# Patient Record
Sex: MALE | Race: WHITE
[De-identification: names, ages, dates, MRNs, and addresses within clinical notes are randomized per-mention and may not be internally consistent; named-entity substitution may affect disease eponyms.]

---

## 2018-09-28 ENCOUNTER — HOSPITAL ENCOUNTER (OUTPATIENT)
Dept: HOSPITAL 47 - LABWHC1 | Age: 68
End: 2018-09-28
Attending: INTERNAL MEDICINE
Payer: MEDICARE

## 2018-09-28 DIAGNOSIS — E78.00: ICD-10-CM

## 2018-09-28 DIAGNOSIS — R79.9: ICD-10-CM

## 2018-09-28 DIAGNOSIS — I10: Primary | ICD-10-CM

## 2018-09-28 LAB
ALBUMIN SERPL-MCNC: 4.2 G/DL (ref 3.5–5)
ALP SERPL-CCNC: 87 U/L (ref 38–126)
ALT SERPL-CCNC: 45 U/L (ref 21–72)
ANION GAP SERPL CALC-SCNC: 8 MMOL/L
AST SERPL-CCNC: 38 U/L (ref 17–59)
BASOPHILS # BLD AUTO: 0 K/UL (ref 0–0.2)
BASOPHILS NFR BLD AUTO: 0 %
BUN SERPL-SCNC: 29 MG/DL (ref 9–20)
CALCIUM SPEC-MCNC: 9.4 MG/DL (ref 8.4–10.2)
CHLORIDE SERPL-SCNC: 105 MMOL/L (ref 98–107)
CHOLEST SERPL-MCNC: 205 MG/DL (ref ?–200)
CO2 SERPL-SCNC: 28 MMOL/L (ref 22–30)
EOSINOPHIL # BLD AUTO: 0.3 K/UL (ref 0–0.7)
EOSINOPHIL NFR BLD AUTO: 4 %
ERYTHROCYTE [DISTWIDTH] IN BLOOD BY AUTOMATED COUNT: 5.79 M/UL (ref 4.3–5.9)
ERYTHROCYTE [DISTWIDTH] IN BLOOD: 13 % (ref 11.5–15.5)
GLUCOSE SERPL-MCNC: 92 MG/DL (ref 74–99)
HBA1C MFR BLD: 5.3 % (ref 4–6)
HCT VFR BLD AUTO: 50.6 % (ref 39–53)
HDLC SERPL-MCNC: 35 MG/DL (ref 40–60)
HGB BLD-MCNC: 16.9 GM/DL (ref 13–17.5)
LDLC SERPL CALC-MCNC: 124 MG/DL (ref 0–99)
LYMPHOCYTES # SPEC AUTO: 1.2 K/UL (ref 1–4.8)
LYMPHOCYTES NFR SPEC AUTO: 18 %
MCH RBC QN AUTO: 29.1 PG (ref 25–35)
MCHC RBC AUTO-ENTMCNC: 33.4 G/DL (ref 31–37)
MCV RBC AUTO: 87.4 FL (ref 80–100)
MONOCYTES # BLD AUTO: 0.4 K/UL (ref 0–1)
MONOCYTES NFR BLD AUTO: 7 %
NEUTROPHILS # BLD AUTO: 4.4 K/UL (ref 1.3–7.7)
NEUTROPHILS NFR BLD AUTO: 68 %
PLATELET # BLD AUTO: 245 K/UL (ref 150–450)
POTASSIUM SERPL-SCNC: 4.4 MMOL/L (ref 3.5–5.1)
PROT SERPL-MCNC: 7.1 G/DL (ref 6.3–8.2)
SODIUM SERPL-SCNC: 141 MMOL/L (ref 137–145)
T4 FREE SERPL-MCNC: 1.06 NG/DL (ref 0.78–2.19)
TRIGL SERPL-MCNC: 229 MG/DL (ref ?–150)
WBC # BLD AUTO: 6.4 K/UL (ref 3.8–10.6)

## 2018-09-28 PROCEDURE — 85025 COMPLETE CBC W/AUTO DIFF WBC: CPT

## 2018-09-28 PROCEDURE — 36415 COLL VENOUS BLD VENIPUNCTURE: CPT

## 2018-09-28 PROCEDURE — 83036 HEMOGLOBIN GLYCOSYLATED A1C: CPT

## 2018-09-28 PROCEDURE — 84443 ASSAY THYROID STIM HORMONE: CPT

## 2018-09-28 PROCEDURE — 80061 LIPID PANEL: CPT

## 2018-09-28 PROCEDURE — 80053 COMPREHEN METABOLIC PANEL: CPT

## 2018-09-28 PROCEDURE — 84439 ASSAY OF FREE THYROXINE: CPT

## 2019-09-11 ENCOUNTER — HOSPITAL ENCOUNTER (OUTPATIENT)
Dept: HOSPITAL 47 - LABWHC1 | Age: 69
Discharge: HOME | End: 2019-09-11
Attending: INTERNAL MEDICINE
Payer: MEDICARE

## 2019-09-11 DIAGNOSIS — E78.2: ICD-10-CM

## 2019-09-11 DIAGNOSIS — R79.9: Primary | ICD-10-CM

## 2019-09-11 DIAGNOSIS — I10: ICD-10-CM

## 2019-09-11 DIAGNOSIS — N40.0: ICD-10-CM

## 2019-09-11 LAB
ALBUMIN SERPL-MCNC: 4.5 G/DL (ref 3.8–4.9)
ALBUMIN/GLOB SERPL: 2.05 G/DL (ref 1.6–3.17)
ALP SERPL-CCNC: 97 U/L (ref 41–126)
ALT SERPL-CCNC: 39 U/L (ref 10–49)
ANION GAP SERPL CALC-SCNC: 3.5 MMOL/L (ref 4–12)
AST SERPL-CCNC: 30 U/L (ref 14–35)
BASOPHILS # BLD AUTO: 0.1 K/UL (ref 0–0.2)
BASOPHILS NFR BLD AUTO: 2 %
BUN SERPL-SCNC: 23 MG/DL (ref 9–27)
BUN/CREAT SERPL: 20.91 RATIO (ref 12–20)
CALCIUM SPEC-MCNC: 9.7 MG/DL (ref 8.7–10.3)
CHLORIDE SERPL-SCNC: 105 MMOL/L (ref 96–109)
CO2 SERPL-SCNC: 31.5 MMOL/L (ref 21.6–31.8)
EOSINOPHIL # BLD AUTO: 0.3 K/UL (ref 0–0.7)
EOSINOPHIL NFR BLD AUTO: 4 %
ERYTHROCYTE [DISTWIDTH] IN BLOOD BY AUTOMATED COUNT: 5.69 M/UL (ref 4.3–5.9)
ERYTHROCYTE [DISTWIDTH] IN BLOOD: 13 % (ref 11.5–15.5)
GLOBULIN SER CALC-MCNC: 2.2 G/DL (ref 1.6–3.3)
GLUCOSE SERPL-MCNC: 90 MG/DL (ref 70–110)
HBA1C MFR BLD: 5.5 % (ref 4–6)
HCT VFR BLD AUTO: 49.9 % (ref 39–53)
HGB BLD-MCNC: 17.3 GM/DL (ref 13–17.5)
LYMPHOCYTES # SPEC AUTO: 1.1 K/UL (ref 1–4.8)
LYMPHOCYTES NFR SPEC AUTO: 17 %
MCH RBC QN AUTO: 30.5 PG (ref 25–35)
MCHC RBC AUTO-ENTMCNC: 34.7 G/DL (ref 31–37)
MCV RBC AUTO: 87.8 FL (ref 80–100)
MONOCYTES # BLD AUTO: 0.4 K/UL (ref 0–1)
MONOCYTES NFR BLD AUTO: 5 %
NEUTROPHILS # BLD AUTO: 4.6 K/UL (ref 1.3–7.7)
NEUTROPHILS NFR BLD AUTO: 70 %
PLATELET # BLD AUTO: 262 K/UL (ref 150–450)
POTASSIUM SERPL-SCNC: 4.5 MMOL/L (ref 3.5–5.5)
PROT SERPL-MCNC: 6.7 G/DL (ref 6.2–8.2)
SODIUM SERPL-SCNC: 140 MMOL/L (ref 135–145)
T4 FREE SERPL-MCNC: 1 NG/DL (ref 0.8–1.8)
WBC # BLD AUTO: 6.5 K/UL (ref 3.8–10.6)

## 2019-09-11 PROCEDURE — 80053 COMPREHEN METABOLIC PANEL: CPT

## 2019-09-11 PROCEDURE — 84439 ASSAY OF FREE THYROXINE: CPT

## 2019-09-11 PROCEDURE — 84443 ASSAY THYROID STIM HORMONE: CPT

## 2019-09-11 PROCEDURE — 83036 HEMOGLOBIN GLYCOSYLATED A1C: CPT

## 2019-09-11 PROCEDURE — 85025 COMPLETE CBC W/AUTO DIFF WBC: CPT

## 2019-09-11 PROCEDURE — 36415 COLL VENOUS BLD VENIPUNCTURE: CPT

## 2019-09-11 PROCEDURE — 84153 ASSAY OF PSA TOTAL: CPT

## 2024-12-24 ENCOUNTER — HOSPITAL ENCOUNTER (OUTPATIENT)
Dept: HOSPITAL 47 - RADCTMAIN | Age: 74
Discharge: HOME | End: 2024-12-24
Attending: DENTIST
Payer: MEDICARE

## 2024-12-24 DIAGNOSIS — C80.1: Primary | ICD-10-CM

## 2024-12-24 DIAGNOSIS — K13.79: ICD-10-CM

## 2024-12-24 DIAGNOSIS — R91.8: ICD-10-CM

## 2024-12-24 LAB
ALBUMIN SERPL-MCNC: 4.7 G/DL (ref 3.5–5)
ALBUMIN/GLOB SERPL: 2 {RATIO}
ALP SERPL-CCNC: 72 U/L (ref 38–126)
ALT SERPL-CCNC: 32 U/L (ref 4–49)
ANION GAP SERPL CALC-SCNC: 5 MMOL/L
APTT BLD: 24.5 SEC (ref 22–30)
AST SERPL-CCNC: 33 U/L (ref 17–59)
BUN SERPL-SCNC: 30 MG/DL (ref 9–20)
CALCIUM SPEC-MCNC: 9.9 MG/DL (ref 8.4–10.2)
CHLORIDE SERPL-SCNC: 106 MMOL/L (ref 98–107)
CO2 SERPL-SCNC: 32 MMOL/L (ref 22–30)
ERYTHROCYTE [DISTWIDTH] IN BLOOD BY AUTOMATED COUNT: 5.6 M/UL (ref 4.3–5.9)
ERYTHROCYTE [DISTWIDTH] IN BLOOD: 13.2 % (ref 11.5–15.5)
GLOBULIN SER CALC-MCNC: 2.4 G/DL
GLUCOSE SERPL-MCNC: 94 MG/DL (ref 74–99)
HCT VFR BLD AUTO: 50 % (ref 39–53)
HGB BLD-MCNC: 16.5 GM/DL (ref 13–17.5)
INR PPP: 1 (ref ?–1.2)
MCH RBC QN AUTO: 29.5 PG (ref 25–35)
MCHC RBC AUTO-ENTMCNC: 33 G/DL (ref 31–37)
MCV RBC AUTO: 89.3 FL (ref 80–100)
PLATELET # BLD AUTO: 253 K/UL (ref 150–450)
POTASSIUM SERPL-SCNC: 4.4 MMOL/L (ref 3.5–5.1)
PREALB SERPL-MCNC: 24.1 MG/DL (ref 18–42)
PROT SERPL-MCNC: 7.1 G/DL (ref 6.3–8.2)
PT BLD: 11 SEC (ref 10–12.5)
SODIUM SERPL-SCNC: 143 MMOL/L (ref 137–145)
WBC # BLD AUTO: 6.2 K/UL (ref 3.8–10.6)

## 2024-12-24 PROCEDURE — 36415 COLL VENOUS BLD VENIPUNCTURE: CPT

## 2024-12-24 PROCEDURE — 70492 CT SFT TSUE NCK W/O & W/DYE: CPT

## 2024-12-24 PROCEDURE — 84443 ASSAY THYROID STIM HORMONE: CPT

## 2024-12-24 PROCEDURE — 85730 THROMBOPLASTIN TIME PARTIAL: CPT

## 2024-12-24 PROCEDURE — 85610 PROTHROMBIN TIME: CPT

## 2024-12-24 PROCEDURE — 85027 COMPLETE CBC AUTOMATED: CPT

## 2024-12-24 PROCEDURE — 84481 FREE ASSAY (FT-3): CPT

## 2024-12-24 PROCEDURE — 70488 CT MAXILLOFACIAL W/O & W/DYE: CPT

## 2024-12-24 PROCEDURE — 70553 MRI BRAIN STEM W/O & W/DYE: CPT

## 2024-12-24 PROCEDURE — 84134 ASSAY OF PREALBUMIN: CPT

## 2024-12-24 PROCEDURE — 71270 CT THORAX DX C-/C+: CPT

## 2024-12-24 PROCEDURE — 80053 COMPREHEN METABOLIC PANEL: CPT

## 2024-12-24 NOTE — CT
EXAMINATION TYPE: CT soft tissue neck wo/w con, CT facial bones wo/w con

CT DLP: 1715.6 (accession [id number]), 1554.2 (accession [id number]) mGycm, Automated exposure control fo
r dose reduction was used.

 

DATE OF EXAM: 12/24/2024 10:18 AM

 

COMPARISON: CT facial bones and chest of the same date.  

 

CLINICAL INDICATION:Male, 74 years old with history of adenoid cystic ca; PHH, adenoid cystic carcino
ma of mouth

 

TECHNIQUE: Standard enhanced CT of the neck and facial bones before and after the uneventful intraven
ous administration of 100 cc of Isovue 300.  Axial sections with coronal and sagittal reformats were 
obtained. 

 

FINDINGS: 

Brain: Visualized portions are grossly unremarkable.

Orbits: Bilateral aphakia otherwise unremarkable.

Sinuses: Grossly unremarkable.

Suprahyoid Neck: The parapharyngeal and retropharyngeal spaces are clear and symmetric. Asymmetric hy
podense prominence in the region of the left submandibular gland measuring up to 1.7 cm (series 3, im
age 62). There is a well demarcated border along the lateral aspect. The nasopharynx is unremarkable.


Infrahyoid Neck: The larynx, hypopharynx, and supraglottic area are clear and symmetric.  

 

Parotid Glands: Unremarkable.

Submandibular Glands: Unremarkable.

 

Musculoskeletal: Degenerative disc disease changes of the visualized spine are present. No acute osse
ous abnormality. No aggressive osseous lesion. Dental amalgam creates streak artifact which limits ev
aluation.

Lymph nodes:  Few nonenlarged lymph nodes are seen along both anterior chains of the neck.  

Vascular structures: Visualized major arteries are patent without evidence of aneurysm.

Thoracic Inlet/airway: Airway is patent. The lung apices are clear.

Soft tissues/Thyroid: Thyroid and remainder of the soft tissues are unremarkable.

Other: none.

 

IMPRESSION

Asymmetric soft tissue prominence within the region of the left sublingual gland likely related to re
ported adenoid cystic carcinoma. No definitive evidence for metastasis within the neck.

 

[facility], Workstation: [id number], 12/24/2024 10:59 AM

## 2024-12-24 NOTE — CT
EXAMINATION TYPE: CT chest wo/w con

CT DLP: 1715.6 mGycm, Automated exposure control for dose reduction was used.

 

DATE OF EXAM: 12/24/2024 10:18 AM

 

COMPARISON: None 

 

CLINICAL INDICATION:Male, 74 years old with history of adenoid cystic ca; PHH, adenoid cystic carcino
ma of mouth

 

TECHNIQUE: Multiple axial images were obtained through the chest before and after the uneventful admi
nistration of 100 mL of Isovue-300 intravenously . Coronal and sagittal reformats reviewed.

 

FINDINGS: 

LUNGS/ PLEURA: No pleural effusion, pneumothorax, or focal consolidation.  Several bilateral lower lo
be pulmonary nodules identified including a right lower lobe 5.3 mm pulmonary nodule (series 11, imag
e 38), right lower lobe 6 mm solid pulmonary nodule (series 11, image 41), right lower lobe 4.7 mm so
lid pulmonary nodule (series 11, image 43), left lower lobe 3.9 mm pulmonary nodule (series 11, image
 24), left lower lobe 3.8 mm pulmonary nodule (series 11, image 45), left lower lobe 9.7 mm pulmonary
 nodule (series 11, image 54). Right lower lobe 4.1 mm pulmonary nodule (series 11, image 26), and a 
left lower lobe 6.4 mm solid pulmonary nodule (series 11, image 58).

AIRWAY: Patent and unremarkable..

HEART: Size within normal limits.Trace pericardial effusion. Tiny atherosclerotic plaque within the L
AD.

MEDIASTINUM: No evidence of adenopathy.

VASCULATURE:  No aortic aneurysm. 

MUSCULOSKELETAL: No acute osseous abnormalities. No aggressive osseous lesion. Multilevel degenerativ
e disc disease with anterior spurring.

SOFT TISSUES/LYMPH NODES: Minimal bilateral gynecomastia.

LOWER NECK: No significant findings.

UPPER ABDOMEN: Subcentimeter hypodense focus within the left hepatic dome which is too small to categ
orize but likely represents a cyst. 

 

IMPRESSION:

Several subcentimeter nonspecific bilateral lower lobe pulmonary nodules. Metastasis is not excluded.
 Correlation with any prior imaging is recommended. Otherwise considered continued surveillance. Most
 of the nodules are below the threshold for PET CT.

 

[facility], Workstation: [id number], 12/24/2024 10:32 AM

## 2024-12-24 NOTE — MR
EXAMINATION TYPE: 

 

MR brain wo/w con

MRI IAC without and with contrast

 

DATE OF EXAM: 12/24/2024 12:11 PM

 

COMPARISON: CT same day 

 

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
c carcinoma, roof of mouth, left side.

IV Contrast: 7.5 cc Gadavist (None if empty)

 

TECHNIQUE:  Multiplanar, multisequence images of the brain and brainstem were acquired before and aft
er administration of  7.5 mL IV Gadavist.  Diffusion weighted imaging is performed. Additional coned 
in sequences along the base of the skull before and after IV contrast.

 

FINDINGS:  

 

Brain:

No evidence for acute infarction, hemorrhage, mass, mass effect, midline shift, herniation, effacemen
t of basal cisterns, or extra-axial fluid collection. 

 

The ventricles and sulci are age-appropriate. Mild volume loss overlying the bilateral cerebral conve
xities.

 

Major intracranial flow voids are intact.

 

T2/FLAIR weighted sequences show no significant white matter signal abnormality.

 

Midline structures demonstrate normal morphology.  The craniocervical junction is normal. 

 

Post contrast images demonstrate no evidence of pathologic enhancement.  Dural venous sinuses are pat
ent.

 

IACs:

There is no cerebellopontine angle mass. 

 

The internal auditory canals are symmetric.  

 

Brainstem and skull base abnormalities are  not seen.

 

Post contrast images demonstrate no evidence of pathologic enhancement in the  posterior cranial naomi
a or the internal auditory canals. 

 

There is no abnormal enhancement of the labyrinths.

 

The area of asymmetric soft tissue prominence in the region of the left sublingual gland described on
 separate CT is not well depicted by MRI. No clear identifiable mass along the left palate.

 

There is trace mucosal thickening ethmoid air cells. Globes appear intact. Slight rightward nasal sep
lidia deviation. Mastoid air cells are clear.

 

 

IMPRESSION (brain and IACs):

 

1. No acute intracranial abnormality seen. No enhancing intracranial lesions. Mild age-related cerebr
al atrophy.

2. The area of asymmetric soft tissue prominence in the region of the left sublingual gland described
 on separate CT is not well depicted by MRI. No clear identifiable mass along the left palate on the 
present study.

 

 

[facility], Workstation: [id number], 12/24/2024 12:38 PM

## 2025-01-07 ENCOUNTER — HOSPITAL ENCOUNTER (OUTPATIENT)
Dept: HOSPITAL 47 - LABWHC1 | Age: 75
Discharge: HOME | End: 2025-01-07
Attending: INTERNAL MEDICINE
Payer: MEDICARE

## 2025-01-07 DIAGNOSIS — R91.1: ICD-10-CM

## 2025-01-07 DIAGNOSIS — R79.1: ICD-10-CM

## 2025-01-07 DIAGNOSIS — Z01.812: Primary | ICD-10-CM

## 2025-01-07 LAB
APTT BLD: 25 SEC (ref 22–30)
INR PPP: 0.9 (ref ?–1.2)
PT BLD: 9.9 SEC (ref 10–12.5)

## 2025-01-07 PROCEDURE — 85610 PROTHROMBIN TIME: CPT

## 2025-01-07 PROCEDURE — 85730 THROMBOPLASTIN TIME PARTIAL: CPT

## 2025-01-07 PROCEDURE — 71046 X-RAY EXAM CHEST 2 VIEWS: CPT

## 2025-01-07 PROCEDURE — 80053 COMPREHEN METABOLIC PANEL: CPT

## 2025-01-07 PROCEDURE — 85025 COMPLETE CBC W/AUTO DIFF WBC: CPT

## 2025-01-07 PROCEDURE — 36415 COLL VENOUS BLD VENIPUNCTURE: CPT

## 2025-01-07 NOTE — XR
EXAMINATION TYPE: XR chest 2V

 

DATE OF EXAM: 1/7/2025 4:49 PM

 

COMPARISON: None

 

CLINICAL INDICATION: Male, 74 years old with history of R91.1 SOLITARY LUNG NODULE; [facility]

 

TECHNIQUE: XR chest 2V Frontal and lateral views of the chest.

 

FINDINGS: 

Lungs/Pleura: No lung nodules visualized. There is no evidence of pleural effusion, focal consolidati
on, or pneumothorax.  

Pulmonary vascularity: Unremarkable.

Heart/mediastinum: Cardiomediastinal silhouette is unremarkable.

Musculoskeletal: No acute osseous pathology.

 

IMPRESSION: 

1.  No acute cardiopulmonary disease/process.

2.  No lung nodules visualized.

 

 

[facility], Workstation: [id number], 1/7/2025 5:26 PM

## 2025-01-08 LAB
ALBUMIN SERPL-MCNC: 4.6 G/DL (ref 3.8–4.9)
ALBUMIN/GLOB SERPL: 1.92 RATIO (ref 1.6–3.17)
ALP SERPL-CCNC: 106 U/L (ref 41–126)
ALT SERPL-CCNC: 27 U/L (ref 10–49)
ANION GAP SERPL CALC-SCNC: 12.1 MMOL/L (ref 4–12)
AST SERPL-CCNC: 27 U/L (ref 14–35)
BASOPHILS # BLD AUTO: 0.03 X 10*3/UL (ref 0–0.1)
BASOPHILS NFR BLD AUTO: 0.4 %
BUN SERPL-SCNC: 22.2 MG/DL (ref 9–27)
BUN/CREAT SERPL: 22.2 RATIO (ref 12–20)
CALCIUM SPEC-MCNC: 9.4 MG/DL (ref 8.7–10.3)
CHLORIDE SERPL-SCNC: 105 MMOL/L (ref 96–109)
CO2 SERPL-SCNC: 25.9 MMOL/L (ref 21.6–31.8)
EOSINOPHIL # BLD AUTO: 0.42 X 10*3/UL (ref 0.04–0.35)
EOSINOPHIL NFR BLD AUTO: 5.5 %
ERYTHROCYTE [DISTWIDTH] IN BLOOD BY AUTOMATED COUNT: 5.67 X 10*6/UL (ref 4.4–5.6)
ERYTHROCYTE [DISTWIDTH] IN BLOOD: 12.8 % (ref 11.5–14.5)
GLOBULIN SER CALC-MCNC: 2.4 G/DL (ref 1.6–3.3)
GLUCOSE SERPL-MCNC: 91 MG/DL (ref 70–110)
HCT VFR BLD AUTO: 51 % (ref 39.6–50)
HGB BLD-MCNC: 16.4 G/DL (ref 13–17)
IMM GRANULOCYTES BLD QL AUTO: 0.4 %
LYMPHOCYTES # SPEC AUTO: 1.17 X 10*3/UL (ref 0.9–5)
LYMPHOCYTES NFR SPEC AUTO: 15.2 %
MCH RBC QN AUTO: 28.9 PG (ref 27–32)
MCHC RBC AUTO-ENTMCNC: 32.2 G/DL (ref 32–37)
MCV RBC AUTO: 89.9 FL (ref 80–97)
MONOCYTES # BLD AUTO: 0.77 X 10*3/UL (ref 0.2–1)
MONOCYTES NFR BLD AUTO: 10 %
NEUTROPHILS # BLD AUTO: 5.27 X 10*3/UL (ref 1.8–7.7)
NEUTROPHILS NFR BLD AUTO: 68.5 %
NRBC BLD AUTO-RTO: 0 X 10*3/UL (ref 0–0.01)
PLATELET # BLD AUTO: 281 X 10*3/UL (ref 140–440)
POTASSIUM SERPL-SCNC: 4.5 MMOL/L (ref 3.5–5.5)
PROT SERPL-MCNC: 7 G/DL (ref 6.2–8.2)
SODIUM SERPL-SCNC: 143 MMOL/L (ref 135–145)
WBC # BLD AUTO: 7.69 X 10*3/UL (ref 4.5–10)

## 2025-07-31 ENCOUNTER — HOSPITAL ENCOUNTER (OUTPATIENT)
Dept: HOSPITAL 47 - LABWHC1 | Age: 75
Discharge: HOME | End: 2025-07-31
Attending: DENTIST
Payer: MEDICARE

## 2025-07-31 DIAGNOSIS — C80.1: Primary | ICD-10-CM

## 2025-07-31 LAB
ALBUMIN SERPL-MCNC: 4.5 G/DL (ref 3.8–4.9)
ANION GAP SERPL CALC-SCNC: 9.9 MMOL/L (ref 4–12)
BUN SERPL-SCNC: 25.8 MG/DL (ref 9–27)
BUN/CREAT SERPL: 25.8 RATIO (ref 12–20)
CALCIUM SPEC-MCNC: 9.4 MG/DL (ref 8.7–10.3)
CHLORIDE SERPL-SCNC: 105 MMOL/L (ref 96–109)
CO2 SERPL-SCNC: 28.1 MMOL/L (ref 21.6–31.8)
CREATININE: 1 MG/DL (ref 0.6–1.5)
GLUCOSE SERPL-MCNC: 97 MG/DL (ref 70–110)
PHOSPHATE SERPL-MCNC: 2.8 MG/DL (ref 2.4–5.1)
POTASSIUM SERPL-SCNC: 4.6 MMOL/L (ref 3.5–5.5)
SODIUM SERPL-SCNC: 143 MMOL/L (ref 135–145)

## 2025-07-31 PROCEDURE — 80069 RENAL FUNCTION PANEL: CPT

## 2025-07-31 PROCEDURE — 36415 COLL VENOUS BLD VENIPUNCTURE: CPT
